# Patient Record
Sex: MALE | Race: OTHER
[De-identification: names, ages, dates, MRNs, and addresses within clinical notes are randomized per-mention and may not be internally consistent; named-entity substitution may affect disease eponyms.]

---

## 2020-09-17 ENCOUNTER — HOSPITAL ENCOUNTER (EMERGENCY)
Dept: HOSPITAL 72 - EMR | Age: 20
Discharge: HOME | End: 2020-09-17
Payer: MEDICAID

## 2020-09-17 VITALS — SYSTOLIC BLOOD PRESSURE: 112 MMHG | DIASTOLIC BLOOD PRESSURE: 75 MMHG

## 2020-09-17 VITALS — WEIGHT: 270 LBS | HEIGHT: 75 IN | BODY MASS INDEX: 33.57 KG/M2

## 2020-09-17 VITALS — DIASTOLIC BLOOD PRESSURE: 89 MMHG | SYSTOLIC BLOOD PRESSURE: 134 MMHG

## 2020-09-17 DIAGNOSIS — R07.9: Primary | ICD-10-CM

## 2020-09-17 DIAGNOSIS — F14.10: ICD-10-CM

## 2020-09-17 DIAGNOSIS — R00.1: ICD-10-CM

## 2020-09-17 PROCEDURE — 96360 HYDRATION IV INFUSION INIT: CPT

## 2020-09-17 PROCEDURE — 93005 ELECTROCARDIOGRAM TRACING: CPT

## 2020-09-17 PROCEDURE — 99284 EMERGENCY DEPT VISIT MOD MDM: CPT

## 2020-09-17 PROCEDURE — 84484 ASSAY OF TROPONIN QUANT: CPT

## 2020-09-17 NOTE — EMERGENCY ROOM REPORT
History of Present Illness


General


Chief Complaint:  Chest Pain


Source:  Patient





Present Illness


HPI


19-year-old male with no past medical history.  He presents with chief complaint

of chest pain.  He said that he snorted a lot of cocaine yesterday.  Today all 

day has been having tightness in his left chest area.  He said it felt like the 

muscle was squeezing and spasming.  Lasting only a few seconds.  No nausea no 

vomiting.  No radiation of the pain.  No fever chills more no exertional 

component.  Denies any other complaint.  Does have some slight shortness of 

breath.  No other symptoms.  Nothing made it better.  Nothing made it worse.


Allergies:  


Coded Allergies:  


     No Known Allergies (Unverified , 9/17/20)





COVID-19 Screening


Contact w/high risk pt:  No


Experienced COVID-19 symptoms?:  No


COVID-19 Testing performed PTA:  No





Patient History


Past Medical History:  see triage record, old chart reviewed


Past Surgical History:  none


Pertinent Family History:  none


Social History:  Reports: drug use


Immunizations:  other


Reviewed Nursing Documentation:  PMH: Agreed; PSxH: Agreed





Nursing Documentation-PMH


Past Medical History:  No Stated History





Review of Systems


Eye:  Denies: eye pain, blurred vision


ENT:  Denies: ear pain, nose congestion, throat swelling


Respiratory:  Denies: cough, shortness of breath


Cardiovascular:  Reports: chest pain; Denies: palpitations


Gastrointestinal:  Denies: abdominal pain, diarrhea, nausea, vomiting


Musculoskeletal:  Denies: back pain, joint pain


Skin:  Denies: rash


Neurological:  Denies: headache, numbness


Endocrine:  Denies: increased thirst, increased urine


Hematologic/Lymphatic:  Denies: easy bruising


All Other Systems:  negative except mentioned in HPI





Physical Exam





Vital Signs








  Date Time  Temp Pulse Resp B/P (MAP) Pulse Ox O2 Delivery O2 Flow Rate FiO2


 


9/17/20 21:36 98.2 62 18 110/73 (85) 95 Room Air  





Vitals normal


Sp02 EP Interpretation:  reviewed, normal


General Appearance:  well appearing, no apparent distress, alert


Head:  normocephalic, atraumatic


Eyes:  bilateral eye PERRL, bilateral eye EOMI


ENT:  hearing grossly normal, normal pharynx


Neck:  full range of motion, supple, no meningismus


Respiratory:  chest non-tender, lungs clear, normal breath sounds


Cardiovascular #1:  regular rate, rhythm, no murmur


Gastrointestinal:  normal bowel sounds, non tender, no mass, no organomegaly, no

bruit, non-distended


Musculoskeletal:  back normal, normal range of motion, gait/station normal


Psychiatric:  mood/affect normal





Medical Decision Making


Diagnostic Impression:  


   Primary Impression:  


   Chest pain


   Qualified Codes:  R07.9 - Chest pain, unspecified


   Additional Impression:  


   Cocaine abuse


ER Course


Presents with atypical chest pain.  EKG is normal.  Troponin is negative.  He 

describes his pain is more of a muscle spasm.  No evidence of ACS, PE, 

dissection to name a few.  Will discharge home.


EKG Diagnostic Results


Rate:  normal, bradycardiac


Rhythm:  NSR


ST Segments:  no acute changes


ASA given to the pt in ED:  Yes





Rhythm Strip Diag. Results


EP Interpretation:  yes


Rate:  49


Rhythm:  NSR, no PVC's, no ectopy





Last Vital Signs








  Date Time  Temp Pulse Resp B/P (MAP) Pulse Ox O2 Delivery O2 Flow Rate FiO2


 


9/17/20 21:36 98.2 62 18 110/73 (85) 95 Room Air  








Status:  improved


Disposition:  HOME, SELF-CARE





Additional Instructions:  


Stop abusing drugs.  Follow-up with your doctor in 7 days.  Return if worse.











Ruel Londono MD                  Sep 17, 2020 22:15

## 2020-11-23 ENCOUNTER — HOSPITAL ENCOUNTER (EMERGENCY)
Dept: HOSPITAL 72 - EMR | Age: 20
Discharge: HOME | End: 2020-11-23
Payer: MEDICAID

## 2020-11-23 VITALS — BODY MASS INDEX: 33.57 KG/M2 | WEIGHT: 270 LBS | HEIGHT: 75 IN

## 2020-11-23 VITALS — SYSTOLIC BLOOD PRESSURE: 132 MMHG | DIASTOLIC BLOOD PRESSURE: 84 MMHG

## 2020-11-23 DIAGNOSIS — J06.9: Primary | ICD-10-CM

## 2020-11-23 PROCEDURE — 99283 EMERGENCY DEPT VISIT LOW MDM: CPT

## 2020-11-23 PROCEDURE — 71045 X-RAY EXAM CHEST 1 VIEW: CPT

## 2020-11-23 NOTE — EMERGENCY ROOM REPORT
History of Present Illness


General


Chief Complaint:  General Complaint


Source:  Patient





Present Illness


HPI


19-year-old male with no significant past medical history here complaining of 3 

days of sudden loss of smell and congestion.  Denies any chest pain, cough, 

shortness of breath, fever or chills.  Reports that he might have come in 

contact with someone who is positive for Covid.  Sitting comfortably with stable

vital signs.  Denies all past medical history.  Denies tobacco smoke, drug use, 

alcohol intake.  Has not taken medication for symptom relief.  Denies diarrhea.


Allergies:  


Coded Allergies:  


     No Known Allergies (Unverified , 9/17/20)





COVID-19 Screening


Contact w/high risk pt:  No


Experienced COVID-19 symptoms?:  Yes


COVID-19 Testing performed PTA:  No


COVID-19 Screening:  Negative COVID-19





Patient History


Past Medical History:  see triage record


Past Surgical History:  none


Pertinent Family History:  none


Immunizations:  UTD


Reviewed Nursing Documentation:  PMH: Agreed; PSxH: Agreed





Nursing Documentation-PMH


Past Medical History:  No Stated History





Review of Systems


All Other Systems:  negative except mentioned in HPI





Physical Exam





Vital Signs








  Date Time  Temp Pulse Resp B/P (MAP) Pulse Ox O2 Delivery O2 Flow Rate FiO2


 


11/23/20 18:35 98.2 71 20 132/84 (100) 96 Room Air  








Sp02 EP Interpretation:  reviewed, normal


General Appearance:  no apparent distress, alert, GCS 15, non-toxic


Head:  normocephalic, atraumatic


Eyes:  bilateral eye normal inspection, bilateral eye PERRL


ENT:  hearing grossly normal, normal pharynx, no angioedema, normal voice


Neck:  full range of motion, supple/symm/no masses


Respiratory:  chest non-tender, lungs clear, normal breath sounds, speaking full

sentences


Cardiovascular #1:  regular rate, rhythm, no edema


Cardiovascular #2:  2+ carotid (R), 2+ carotid (L), 2+ radial (R), 2+ radial 

(L), 2+ dorsalis pedis (R), 2+ dorsalis pedis (L)


Gastrointestinal:  normal inspection


Rectal:  deferred


Genitourinary:  no CVA tenderness


Musculoskeletal:  back normal


Neurologic:  alert, motor strength/tone normal, oriented x3, sensory intact, 

responsive, speech normal


Psychiatric:  judgement/insight normal, memory normal, mood/affect normal, no 

suicidal/homicidal ideation


Skin:  no rash


Lymphatic:  no adenopathy





Medical Decision Making


PA Attestation


All my diagnosis and treatment plans were reviewed ad discussed with my 

supervising physician Dr. García


Diagnostic Impression:  


   Primary Impression:  


   Loss of smell


   Additional Impression:  


   URI (upper respiratory infection)


ER Course


19-year-old male with no significant past medical history here complaining of 3 

days of sudden loss of smell and congestion.  Denies any chest pain, cough, 

shortness of breath, fever or chills.  Reports that he might have come in 

contact with someone who is positive for Covid.  Sitting comfortably with stable

vital signs.  Denies all past medical history.  Denies tobacco smoke, drug use, 

alcohol intake.  Has not taken medication for symptom relief.  Denies diarrhea.





Ddx considered but are not limited to: strep pharyngitis, URI, tonsillitis, 

peritonsillar abscess, influneza














Vital signs: are WNL, pt. is afebrile








 H&PE are most consistent with: Possible COVID-19, URI














ORDERS: Azithromycin, prednisone for symptom relief











ED INTERVENTIONS: None required at this time.








Patient take medication as directed, follow-up with primary care provider, self 

isolate for 14 days, get tested for Covid as symptoms are most likely 

representative of Covid.  If worsening symptoms return to emergency room














DISCHARGE: At this time pt. is stable for d/c to home. Will provide printed 

patient care instructions, and any necessary prescriptions. Care plan and follow

up instructions have been discussed with the patient prior to discharge.


Chest X-Ray Diagnostic Results


Chest X-Ray Diagnostic Results :  


   Chest X-Ray Ordered:  Yes


   # of Views/Limited/Complete:  1 View


   Indication:  Other


   EP Interpretation:  Yes


   PA Xray:  Interpretation reviewed, by supervising MD, and agrees with 

findings.


   Interpretation:  no consolidation, no effusion, no pneumothorax


   Impression:  No acute disease


   Electronically Signed by:  Shun Gonzalez PA-C





Last Vital Signs








  Date Time  Temp Pulse Resp B/P (MAP) Pulse Ox O2 Delivery O2 Flow Rate FiO2


 


11/23/20 18:45 98.2  20 132/84 96 Room Air  


 


11/23/20 18:45  71      








Disposition:  HOME, SELF-CARE


Condition:  Stable


Scripts


Prednisone* (PREDNISONE*) 20 Mg Tablet


40 MG ORAL DAILY for 5 Days, #10 TAB


   Prov: Shun Leo         11/23/20 


Azithromycin* (ZITHROMAX*) 250 Mg Tablet


250 MG ORAL DAILY, #6 TAB 0 Refills


   Take two tables once daily for 1 day, then one tablet once daily for 4


   days.


   Prov: Shun Leo         11/23/20


Patient Instructions:  Upper Respiratory Infection, Adult, Easy-to-Read





Additional Instructions:  


Take medication as directed, follow primary care provider, if worsening symptom 

return to the emergency room also get tested for Covid and self isolate for 2 

weeks.











Shun Leo      Nov 23, 2020 19:33

## 2020-11-23 NOTE — NUR
ED Nurse Note:

Patient  walked in to ER  from home and walked in due to loss of smell x 3 
days. Pt had runny nose and sore throat x 1 day. Denies any other symptoms. 
Patient AAO x4, all VSS at this time